# Patient Record
Sex: MALE | Race: ASIAN | NOT HISPANIC OR LATINO | URBAN - METROPOLITAN AREA
[De-identification: names, ages, dates, MRNs, and addresses within clinical notes are randomized per-mention and may not be internally consistent; named-entity substitution may affect disease eponyms.]

---
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
## 2021-07-08 ENCOUNTER — EMERGENCY (EMERGENCY)
Facility: HOSPITAL | Age: 21
LOS: 1 days | Discharge: HOME | End: 2021-07-08
Attending: INTERNAL MEDICINE | Admitting: INTERNAL MEDICINE
Payer: MEDICAID

## 2021-07-08 VITALS
TEMPERATURE: 100 F | RESPIRATION RATE: 15 BRPM | DIASTOLIC BLOOD PRESSURE: 81 MMHG | OXYGEN SATURATION: 97 % | HEIGHT: 69 IN | SYSTOLIC BLOOD PRESSURE: 139 MMHG | HEART RATE: 127 BPM

## 2021-07-08 PROCEDURE — 10061 I&D ABSCESS COMP/MULTIPLE: CPT

## 2021-07-08 PROCEDURE — 10060 I&D ABSCESS SIMPLE/SINGLE: CPT

## 2021-07-08 PROCEDURE — 99283 EMERGENCY DEPT VISIT LOW MDM: CPT | Mod: 25

## 2021-07-08 RX ADMIN — Medication 1 TABLET(S): at 23:02

## 2021-07-08 NOTE — ED PROVIDER NOTE - NSFOLLOWUPINSTRUCTIONS_ED_ALL_ED_FT
take the antibiotic twice daily as directed  f/u with surgical specialist tomorrow, he needs to reevaluate you and needs to remove the internal packing   return if you have worsening symptoms or fever     An abscess is an infected area that contains a collection of pus and debris. It can occur in almost any part of the body and occurs when the tissue gets infection. Symptoms include a painful mass that is red, warm, tender that might break open and HAVE drainage. If your health care provider gave you antibiotics make sure to take the full course and do not stop even if feeling better.     SEEK IMMEDIATE MEDICAL CARE IF YOU HAVE ANY OF THE FOLLOWING SYMPTOMS: chills, fever, muscle aches, or red streaking from the area.

## 2021-07-08 NOTE — ED PROVIDER NOTE - SKIN, MLM
left buttock abscess, not involving the rectum was   already draining  and then packed with iodoform.

## 2021-07-08 NOTE — ED PROVIDER NOTE - CHIEF COMPLAINT
[FreeTextEntry1] : \par Discussed schooling/socializing for children during pandemic, including importance of strict hand hygiene, face masks, social distancing and monitoring for symptoms. PCR sent.\par  The patient is a 21y Male complaining of abscess.

## 2021-07-08 NOTE — ED PROVIDER NOTE - CARE PROVIDER_API CALL
Jeffrey Damian (MD)  Surgery  [address], [place] [id number]  Phone: [phone]  Fax: [phone]  Follow Up Time: 1-3 Days

## 2021-07-08 NOTE — ED ADULT NURSE NOTE - OBJECTIVE STATEMENT
Pt A&Ox4, ambulatory to ED c/o wound to buttocks.  Pt states he noticed a "pimple" about 6 days ago which has grown significantly larger and more painful.  Pt presents with abscess to left medial buttock near rectum draining blood and purulent fluid. Pt A&Ox4, ambulatory to ED c/o wound to buttocks.  Pt states he noticed a "pimple" about 6 days ago which has grown significantly larger and more painful.  Pt presents with abscess to left medial buttock near rectum draining blood and purulent fluid with surrounding redness.

## 2021-07-08 NOTE — ED PROVIDER NOTE - PATIENT PORTAL LINK FT
You can access the FollowMyHealth Patient Portal offered by [facility] by registering at the following website: [email or web address]. By joining [employer]’s FollowMyHealth portal, you will also be able to view your health information using other applications (apps) compatible with our system.

## 2021-07-08 NOTE — ED PROVIDER NOTE - CLINICAL SUMMARY MEDICAL DECISION MAKING FREE TEXT BOX
right buttock abscess that drained spontaneously, packed with iodoform  rx Augmentin and f/u with Dr kessler

## 2021-07-08 NOTE — ED PROVIDER NOTE - OBJECTIVE STATEMENT
22 y/o male with left buttock abscess that spontaneously burst and drained in the ED, no fever, no chills, no toxemia

## 2021-07-10 ENCOUNTER — EMERGENCY (EMERGENCY)
Facility: HOSPITAL | Age: 21
LOS: 1 days | Discharge: HOME | End: 2021-07-10
Attending: EMERGENCY MEDICINE | Admitting: EMERGENCY MEDICINE
Payer: MEDICAID

## 2021-07-10 VITALS
HEART RATE: 88 BPM | OXYGEN SATURATION: 98 % | TEMPERATURE: 98 F | RESPIRATION RATE: 16 BRPM | SYSTOLIC BLOOD PRESSURE: 136 MMHG | DIASTOLIC BLOOD PRESSURE: 78 MMHG

## 2021-07-10 VITALS
RESPIRATION RATE: 18 BRPM | WEIGHT: 225.09 LBS | OXYGEN SATURATION: 99 % | HEIGHT: 69 IN | TEMPERATURE: 98 F | DIASTOLIC BLOOD PRESSURE: 81 MMHG | SYSTOLIC BLOOD PRESSURE: 140 MMHG | HEART RATE: 124 BPM

## 2021-07-10 PROCEDURE — L9995: CPT

## 2021-07-10 PROCEDURE — G0463: CPT

## 2021-07-10 NOTE — ED PROVIDER NOTE - CARE PROVIDER_API CALL
Jeffrey Damian (MD)  Surgery  [address] [place], [place] [id number]  Phone: [phone]  Fax: [phone]  Follow Up Time: 1-3 Days

## 2021-07-10 NOTE — ED PROVIDER NOTE - OBJECTIVE STATEMENT
22 yo male with no significant pmh presents to the ED for wound check and packing removal. Patient was seen in ED on Thursday for abscess to left buttock x several days. Patient had I+D and packing was placed. PAtient was instructed to follow up with surgery for re-eval packing removal. Patient unable to get appointment yesterday or today so came to ED. PAtient does have an appointment with Dr. Damian for this Tuesday. Patient states he is feeling significantly better, pain and wound drainage improved. Denies fever, chills, chest pain, sob, abd pain, N/V, UE/LE weakness or paresthesias. good PO intact.

## 2021-07-10 NOTE — ED ADULT TRIAGE NOTE - CHIEF COMPLAINT QUOTE
Patient is a 22yo male presenting to [facility] with buttocks pain. Patient was seen her on Thursday for a  ruptured abscess that had been I&D by our ED MDs patient was instructed by MD Emanuel to come back to the ED if he could not get a appointment to remove the packing on Friday. Patient is to be evaluated for possible sx

## 2021-07-10 NOTE — ED ADULT NURSE NOTE - CHIEF COMPLAINT QUOTE
Patient is a 20yo male presenting to [facility] with buttocks pain. Patient was seen her on Thursday for a  ruptured abscess that had been I&D by our ED MDs patient was instructed by MD Emanuel to come back to the ED if he could not get a appointment to remove the packing on Friday. Patient is to be evaluated for possible sx

## 2021-07-10 NOTE — ED PROVIDER NOTE - NSFOLLOWUPINSTRUCTIONS_ED_ALL_ED_FT
1) Take motrin and tylenol for pain  2) Continue to clean area and change guaze  3) Use warm compresses and sitz baths  4) Follow up with Dr. Damian as  scheduled for this Tuesday    Wound Check  If you have a wound, it may take some time to heal. Eventually, a scar will form. The scar will also fade with time. It is important to take care of your wound while it is healing. This helps to protect your wound from infection.    How should I take care of my wound at home?  Some wounds are allowed to close on their own or are repaired at a later date. There are many different ways to close and cover a wound, including stitches (sutures), skin glue, and adhesive strips. Follow your health care provider's instructions about:    Wound care.  Bandage (dressing) changes and removal.  Wound closure removal.    Take medicines only as directed by your health care provider.  Keep all follow-up visits as directed by your health care provider. This is important.  Do not take baths, swim, or use a hot tub until your health care provider approves. You may shower as directed by your health care provider.  Keep your wound clean and dry.  What affects scar formation?  Scars affect each person differently. How your body scars depends on:    The location and size of your wound.  Traits that you inherited from your parents (genetic predisposition).  How you take care of your wound. Irritation and inflammation increase the amount of scar formation.  Sun exposure. This can darken a scar.    When should I call or see my health care provider?  Call or see your health care provider if:    You have redness, swelling, or pain at your wound site.  You have fluid, blood, or pus coming from your wound.  You have muscle aches, chills, or a general ill feeling.  You notice a bad smell coming from the wound.  Your wound separates after the sutures, staples, or skin adhesive strips have been removed.  You have persistent nausea or vomiting.  You have a fever.  You are dizzy.    When should I call 911 or go to the emergency room?  Call 911 or go to the emergency room if:    You faint.  You have difficulty breathing.    This information is not intended to replace advice given to you by your health care provider. Make sure you discuss any questions you have with your health care provider.

## 2021-07-10 NOTE — ED PROVIDER NOTE - PATIENT PORTAL LINK FT
You can access the FollowMyHealth Patient Portal offered by [facility] by registering at the following website: [email or web address]. By joining [employer]’s FollowMyHealth portal, you will also be able to view your health information using other applications (apps) compatible with our system.

## 2021-07-10 NOTE — ED PROVIDER NOTE - ATTENDING CONTRIBUTION TO CARE
20 yo M p/w here for wound check, packing removal for buttocks abscess sp I+D 2 d ago. Pt with no acute pain, improved from prev. no fever/chills. minimal discharge. no fever/chills. no agg/allev factors. no other inj or co.  exam: MM Moist. non-toxic, well appearing. R medial abscess sp I+D with no active discharge, no surr erythema. Pt on abx. No other acute findings.  Pt has appt with surg Tues, will con abx and will return with any changes or concerns.

## 2021-07-10 NOTE — ED PROVIDER NOTE - CLINICAL SUMMARY MEDICAL DECISION MAKING FREE TEXT BOX
20 yo male with no significant pmh presents to the ED for wound check and packing removal. Patient was seen in ED on Thursday for abscess to left buttock x several days. Patient had I+D and packing was placed. PAtient was instructed to follow up with surgery for re-eval packing removal. Patient unable to get appointment yesterday or today so came to ED. PAtient does have an appointment with Dr. Damian for this Tuesday. Patient states he is feeling significantly better, pain and wound drainage improved. Denies fever, chills, chest pain, sob, abd pain, N/V, UE/LE weakness or paresthesias. good PO intact. PE: as above. A/P: abscess/wound improving, packing removing, wound cleansed and dressed with guaze. no packing was replaced at this time. Patient has an appointment with Dr. Damian this Tuesday.

## 2021-07-10 NOTE — ED PROVIDER NOTE - SKIN WOUND TYPE
+ healing abscess of left buttock with packing in place. + mild surrounding erythema and induration. no active drainage from wound. no obvious cellulitis.

## 2021-07-13 ENCOUNTER — APPOINTMENT (OUTPATIENT)
Dept: SURGERY | Facility: CLINIC | Age: 21
End: 2021-07-13
Payer: MEDICAID

## 2021-07-13 VITALS
HEIGHT: 76 IN | OXYGEN SATURATION: 99 % | WEIGHT: 242.06 LBS | HEART RATE: 92 BPM | DIASTOLIC BLOOD PRESSURE: 70 MMHG | BODY MASS INDEX: 29.48 KG/M2 | TEMPERATURE: 97.3 F | SYSTOLIC BLOOD PRESSURE: 122 MMHG

## 2021-07-13 PROCEDURE — 99203 OFFICE O/P NEW LOW 30 MIN: CPT

## 2021-07-20 ENCOUNTER — APPOINTMENT (OUTPATIENT)
Dept: SURGERY | Facility: CLINIC | Age: 21
End: 2021-07-20
Payer: MEDICAID

## 2021-07-20 VITALS
OXYGEN SATURATION: 98 % | HEIGHT: 76 IN | HEART RATE: 93 BPM | TEMPERATURE: 97.7 F | WEIGHT: 241.18 LBS | BODY MASS INDEX: 29.37 KG/M2 | DIASTOLIC BLOOD PRESSURE: 78 MMHG | SYSTOLIC BLOOD PRESSURE: 120 MMHG

## 2021-07-20 DIAGNOSIS — L02.31 CUTANEOUS ABSCESS OF BUTTOCK: ICD-10-CM

## 2021-07-20 PROCEDURE — 99212 OFFICE O/P EST SF 10 MIN: CPT

## 2021-07-20 RX ORDER — AMOXICILLIN AND CLAVULANATE POTASSIUM 875; 125 MG/1; 1/1
875-125 TABLET, FILM COATED ORAL
Refills: 0 | Status: DISCONTINUED | COMMUNITY
End: 2021-07-20

## 2021-07-20 NOTE — REVIEW OF SYSTEMS
[Feeling Poorly] : not feeling poorly [Feeling Tired] : not feeling tired [As Noted in HPI] : as noted in HPI [Anxiety] : anxiety [Depression] : no depression [Negative] : Heme/Lymph [de-identified] : regarding this issue and visit...

## 2021-07-20 NOTE — HISTORY OF PRESENT ILLNESS
[de-identified] : Very pleasant young man who presents to office today in presence of his father for General Surgery follow-up.\par He is now s/p initial ER drainage of a left posterior thigh abscess with interval Urgent Care follow-up for packing/ dressing change.\par He reports being compliant with his oral antibiotics.\par He denies acute pain today and reports minimal residual discomfort.\par While he is understandably anxious about this whole process, he and his father are actually quite confident in performing the daily dressing changes... [de-identified] : Very pleasant young man who returns to the office today in presence of his father for additional Surgery follow-up.\par He is now s/p initial ER drainage of a left posterior thigh abscess with interval Urgent Care follow-up for packing/ dressing change and then our visit.\par He reports completion of his oral antibiotics.\par He denies acute pain today and no further residual discomfort.\par While he is more confident about this whole process with him and his father confident in performing the daily washing and dry sterile dressing change...

## 2021-07-20 NOTE — PLAN
[FreeTextEntry1] : Left posterior thigh abscess by history and prior evaluations.\par Now s/p seemingly successfully incision and drainage.\par Doing well by history.  Exam today reassuring.\par Already s/p one packing change.  Iodoform repacked today without issue.\par To complete course of Augmentin therapy.\par Will remove outer dressing in 48 hours.  To remove inner packing in 72 hours.\par Then will simply keep clean with DSD.\par Encouraged to call with questions or concerns or changes at any time.  \par Otherwise, RTO in 1 week.  I remain available.\par They are pleased and able to verbalize the instructions and overall plan as above.\par Please note that more than 50% of face to face time was spent in counseling and coordination of care.

## 2021-07-20 NOTE — PHYSICAL EXAM
[Respiratory Effort] : normal respiratory effort [Normal Rate and Rhythm] : normal rate and rhythm [No HSM] : no hepatosplenomegaly [Tender] : was nontender [Enlarged] : not enlarged [Alert] : alert [Oriented to Person] : oriented to person [Oriented to Place] : oriented to place [Oriented to Time] : oriented to time [Anxious] : anxious [de-identified] : Appears well, no acute distress, ambulates easily into office and assumes examination table without need of assistance.  [de-identified] : Normocephalic and atraumatic.  [de-identified] : Supple with full range of motion.  [FreeTextEntry1] : No cervical, supraclavicular, axillary or inguinal adenopathy.  [de-identified] : Soft, non tense and non tender. [de-identified] : No visible lesions or palpable masses. [de-identified] : Normal external genitalia. [de-identified] : Deferred.  [de-identified] : Grossly symmetric and within normal limits without any obvious motor or sensory deficits.  [de-identified] : Small, but clean wound ~ 2.5 cm in size along posterior proximal aspect of left thigh just inferior to infra-buttock fold.  Some exposed SQ tissue with tunneling or depth down to 1.5 cm, but no expressible drainage, no appreciable odor, no necrotic tissues and minimal fibrinous exudate without surrounding cellulitis.  Tolerated exam and repacking well! [de-identified] : though appropriately so...

## 2021-07-20 NOTE — HISTORY OF PRESENT ILLNESS
[de-identified] : Very pleasant young man who presents to office today in presence of his father for General Surgery follow-up.\par He is now s/p initial ER drainage of a left posterior thigh abscess with interval Urgent Care follow-up for packing/ dressing change.\par He reports being compliant with his oral antibiotics.\par He denies acute pain today and reports minimal residual discomfort.\par While he is understandably anxious about this whole process, he and his father are actually quite confident in performing the daily dressing changes...

## 2021-07-20 NOTE — PHYSICAL EXAM
[Respiratory Effort] : normal respiratory effort [Normal Rate and Rhythm] : normal rate and rhythm [No HSM] : no hepatosplenomegaly [Tender] : was nontender [Enlarged] : not enlarged [Alert] : alert [Oriented to Person] : oriented to person [Oriented to Place] : oriented to place [Oriented to Time] : oriented to time [Anxious] : anxious [de-identified] : Appears well, no acute distress, ambulates easily into office.  [de-identified] : Still supple with full range of motion.  [de-identified] : Remains normocephalic and atraumatic.  [FreeTextEntry1] : No cervical, supraclavicular, axillary or inguinal adenopathy today.  [de-identified] : No visible lesion or palpable mass. [de-identified] : Soft, non tense and non tender. [de-identified] : Normal external genitalia. [de-identified] : Deferred.  [de-identified] : Grossly symmetric and within normal limits without motor or sensory deficit.  [de-identified] : Small, but clean wound ~ 1.5 cm in size along posterior proximal aspect of left thigh just inferior to infra-buttock fold.  Some exposed SQ tissue with tunneling or depth down to .5 cm, but no expressible drainage, no appreciable odor, no necrotic tissues and minimal fibrinous exudate without surrounding cellulitis.  Good granulation tissue starting.  Tolerated exam and repacking well. [de-identified] : though appropriately so...

## 2021-07-20 NOTE — PLAN
[FreeTextEntry1] : Left posterior thigh abscess by his history and the prior evaluations.\par Now s/p seemingly successfully incision and drainage.\par Doing well by today's history with exam now reassuring.\par He has completed his course of Augmentin therapy.\par Will simply keep clean with DSD now and wound shallow, sam and clean..\par Encouraged to call with questions, concerns or changes at any time.  \par Otherwise, RTO in 1 month.  I remain available.\par Mr. Hernandez and his father are pleased and able to verbalize the instructions and overall plan as above.\par Please note that more than 50% of face to face time was spent in counseling and coordination of care.

## 2021-07-20 NOTE — REVIEW OF SYSTEMS
[Feeling Poorly] : not feeling poorly [Feeling Tired] : not feeling tired [As Noted in HPI] : as noted in HPI [Anxiety] : anxiety [Depression] : no depression [Negative] : Heme/Lymph [de-identified] : regarding this issue and visit, though less so...

## 2021-08-17 ENCOUNTER — APPOINTMENT (OUTPATIENT)
Dept: SURGERY | Facility: CLINIC | Age: 21
End: 2021-08-17
Payer: MEDICAID

## 2021-08-17 VITALS
SYSTOLIC BLOOD PRESSURE: 120 MMHG | DIASTOLIC BLOOD PRESSURE: 80 MMHG | TEMPERATURE: 96.9 F | BODY MASS INDEX: 29.35 KG/M2 | HEIGHT: 76 IN | OXYGEN SATURATION: 99 % | HEART RATE: 89 BPM | WEIGHT: 241 LBS

## 2021-08-17 PROCEDURE — 99213 OFFICE O/P EST LOW 20 MIN: CPT

## 2021-08-17 NOTE — REVIEW OF SYSTEMS
[As Noted in HPI] : as noted in HPI [Anxiety] : anxiety [Negative] : Heme/Lymph [Feeling Poorly] : not feeling poorly [Feeling Tired] : not feeling tired [Depression] : no depression [de-identified] : regarding this issue and visit, though much less so...

## 2021-08-17 NOTE — PHYSICAL EXAM
[Respiratory Effort] : normal respiratory effort [Normal Rate and Rhythm] : normal rate and rhythm [No HSM] : no hepatosplenomegaly [Alert] : alert [Oriented to Person] : oriented to person [Oriented to Place] : oriented to place [Oriented to Time] : oriented to time [Anxious] : anxious [Tender] : was nontender [Enlarged] : not enlarged [de-identified] : Appears well, no acute distress, ambulates easily into office today.  [de-identified] : Remains normocephalic and atraumatic- all stable.  [de-identified] : Still supple with full range of motion- all stable.  [FreeTextEntry1] : No cervical, supraclavicular, axillary or inguinal adenopathy on today's exam.  [de-identified] : No visible lesions or palpable masses. [de-identified] : Soft, non tense and non tender- all quite stable. [de-identified] : Normal external genitalia. [de-identified] : Deferred.  [de-identified] : Grossly symmetric and within normal limits without motor or sensory deficit.  [de-identified] : Small, but clean wound ~.5 cm in size along posterior proximal aspect of left thigh just around infra-buttock fold.  Some exposed SQ tissue with tunneling or depth down to .5 cm, but no expressible drainage, no appreciable odor, no necrotic tissues and now no fibrinous exudate or surrounding cellulitis.  Good granulation tissue starting.  Tolerated exam well. [de-identified] : though appropriately so...

## 2021-08-17 NOTE — HISTORY OF PRESENT ILLNESS
[de-identified] : Very pleasant young man who presents to office today in presence of his father for General Surgery follow-up.\par He is now s/p initial ER drainage of a left posterior thigh abscess with interval Urgent Care follow-up for packing/ dressing change.\par He reports being compliant with his oral antibiotics.\par He denies acute pain today and reports minimal residual discomfort.\par While he is understandably anxious about this whole process, he and his father are actually quite confident in performing the daily dressing changes... [de-identified] : Very pleasant young man who returns to the office today once again in presence of his father for additional Surgery follow-up.\par He is now s/p initial ER drainage of a left posterior thigh abscess with interval Urgent Care follow-up for packing/ dressing change and then Surgery evaluation.\par He reports completion of his oral antibiotics and no new treatments since our last visit.\par He denies acute pain today and no further residual discomfort and remains free of odor or drainage.\par "I'm doing well!"

## 2021-09-01 ENCOUNTER — APPOINTMENT (OUTPATIENT)
Dept: SURGERY | Facility: CLINIC | Age: 21
End: 2021-09-01
Payer: MEDICAID

## 2021-09-01 VITALS
OXYGEN SATURATION: 99 % | TEMPERATURE: 97.4 F | SYSTOLIC BLOOD PRESSURE: 112 MMHG | DIASTOLIC BLOOD PRESSURE: 80 MMHG | HEIGHT: 76 IN | HEART RATE: 82 BPM | BODY MASS INDEX: 29.35 KG/M2 | WEIGHT: 241 LBS

## 2021-09-01 DIAGNOSIS — L02.416 CUTANEOUS ABSCESS OF LEFT LOWER LIMB: ICD-10-CM

## 2021-09-01 PROCEDURE — 99213 OFFICE O/P EST LOW 20 MIN: CPT

## 2021-09-01 NOTE — PLAN
[FreeTextEntry1] : Left posterior thigh/ buttock abscess by his history and the prior evaluations.\par Now s/p incision and drainage.  Doing well by today's history with resolution of all complaints.\par Site treated with silver nitrate last visit.  Today, wound is closed, free of collection and no apparent residual cyst.\par Encouraged to call with questions, concerns or changes at any time in the future.  \par No additional recommendations at this time.  Clinically well and surgically stable.\par Site appears to be far enough from anus to not represent fistula.\par However, should there be a future recurrence, consideration can be given to OR excision +/- exploration to prevent further future issue.\par Mr. Hernandez and his father are pleased and able to verbalize the instructions and overall plan as above.\par Please note that more than 50% of face to face time was spent in counseling and coordination of care. \par I remain available

## 2021-09-01 NOTE — HISTORY OF PRESENT ILLNESS
[de-identified] : Very pleasant young man who presents to office today in presence of his father for General Surgery follow-up.\par He is now s/p initial ER drainage of a left posterior thigh abscess with interval Urgent Care follow-up for packing/ dressing change.\par He reports being compliant with his oral antibiotics.\par He denies acute pain today and reports minimal residual discomfort.\par While he is understandably anxious about this whole process, he and his father are actually quite confident in performing the daily dressing changes... [de-identified] : Very pleasant young man who returns to the office today once again in presence of father for additional follow-up.\par He is now s/p initial ER drainage of a left posterior thigh/ buttock abscess with interval Urgent Care follow-up for packing/ dressing change and then Surgery evaluation.\par He reports no pain or drainage or odor since our last visit.\par He is pain free today.\par He again states that "I'm doing very well!"

## 2021-09-01 NOTE — PHYSICAL EXAM
[Respiratory Effort] : normal respiratory effort [Normal Rate and Rhythm] : normal rate and rhythm [No HSM] : no hepatosplenomegaly [Tender] : was nontender [Enlarged] : not enlarged [Alert] : alert [Oriented to Person] : oriented to person [Oriented to Place] : oriented to place [Oriented to Time] : oriented to time [Calm] : calm [de-identified] : Appears well, no acute distress, ambulates easily into the office.  [de-identified] : Remains normocephalic and atraumatic- all quite stable.  [de-identified] : Still supple with full range of motion- all quite stable.  [FreeTextEntry1] : No axillary or inguinal adenopathy on today's exam.  [de-identified] : No visible lesion or palpable mass. [de-identified] : Soft, non tense and non tender- all quite stable. [de-identified] : Normal external genitalia. [de-identified] : Deferred.  [de-identified] : Grossly symmetric and WNL without motor or sensory deficit.  [de-identified] : Wound site along posterior proximal aspect of left thigh/ buttock all closed and reepithelialized.  No expressible drainage.  No appreciable odor.  No SQ collection.  No inflammatory changes.  Non tender on my exam.

## 2024-05-01 NOTE — PLAN
Pt called back to let us know that his insurance has changed to [employer] (insurance we do not accept with [facility]).      Is there anything we can do on having care coordination reach out to him to help him get switched back his insurance.    Please advise         [FreeTextEntry1] : Left posterior thigh/ buttock abscess by his history and the prior evaluations.\par Now s/p incision and drainage.\par Doing well by today's history with exam once again today reassuring.\par He has completed his course of Augmentin therapy without need or indication for further ABX now.\par Site is small and sam and clean with small area of exposed SQ tissue with granulation treated with silver nitrate today.\par Encouraged to call with questions, concerns or changes at any time.  \par Otherwise, RTO in 2 weeks to reassess site and need for further Silver Nitrate.  \par In the interim, plan is for simple warm water and soap wash with DSD PRN.  I remain available.\par Mr. Hernandez and his father are pleased and able to verbalize the instructions and overall plan as above.\par Please note that more than 50% of face to face time was spent in counseling and coordination of care.

## 2024-09-29 NOTE — ED ADULT NURSE NOTE - DOES PATIENT HAVE ADVANCE DIRECTIVE
No
Assistance OOB with selected safe patient handling equipment if applicable/Assistance with ambulation/Communicate risk of Fall with Harm to all staff, patient, and family/Monitor gait and stability/Provide patient with walking aids/Provide visual cue: red socks, yellow wristband, yellow gown, etc/Reinforce activity limits and safety measures with patient and family/Bed in lowest position, wheels locked, appropriate side rails in place/Call bell, personal items and telephone in reach/Instruct patient to call for assistance before getting out of bed/chair/stretcher/Non-slip footwear applied when patient is off stretcher/[place] to call system/Physically safe environment - no spills, clutter or unnecessary equipment/Purposeful Proactive Rounding/Room/bathroom lighting operational, light cord in reach

## 2025-04-28 ENCOUNTER — APPOINTMENT (OUTPATIENT)
Dept: ORTHOPEDIC SURGERY | Facility: CLINIC | Age: 25
End: 2025-04-28